# Patient Record
Sex: FEMALE | Race: WHITE | NOT HISPANIC OR LATINO | Employment: UNEMPLOYED | URBAN - METROPOLITAN AREA
[De-identification: names, ages, dates, MRNs, and addresses within clinical notes are randomized per-mention and may not be internally consistent; named-entity substitution may affect disease eponyms.]

---

## 2017-01-05 ENCOUNTER — OFFICE VISIT (OUTPATIENT)
Dept: ALLERGY | Facility: CLINIC | Age: 32
End: 2017-01-05
Payer: COMMERCIAL

## 2017-01-05 ENCOUNTER — HOSPITAL ENCOUNTER (OUTPATIENT)
Dept: RADIOLOGY | Facility: HOSPITAL | Age: 32
Discharge: HOME | End: 2017-01-05
Attending: ALLERGY & IMMUNOLOGY
Payer: COMMERCIAL

## 2017-01-05 DIAGNOSIS — R60.9 SWELLING: ICD-10-CM

## 2017-01-05 DIAGNOSIS — T78.3XXA ANGIOEDEMA, INITIAL ENCOUNTER: ICD-10-CM

## 2017-01-05 DIAGNOSIS — L50.9 URTICARIA, UNSPECIFIED: Primary | ICD-10-CM

## 2017-01-05 DIAGNOSIS — L50.9 URTICARIA, UNSPECIFIED: ICD-10-CM

## 2017-01-05 DIAGNOSIS — Z83.49 FAMILY HISTORY OF THYROID DISEASE: ICD-10-CM

## 2017-01-05 DIAGNOSIS — L29.9 ITCHING: ICD-10-CM

## 2017-01-05 PROCEDURE — 71020 XR CHEST PA AND LATERAL: CPT | Mod: 26,,, | Performed by: RADIOLOGY

## 2017-01-05 PROCEDURE — 71020 XR CHEST PA AND LATERAL: CPT | Mod: TC

## 2017-01-05 PROCEDURE — 99999 PR PBB SHADOW E&M-NEW PATIENT-LVL III: CPT | Mod: PBBFAC,,, | Performed by: ALLERGY & IMMUNOLOGY

## 2017-01-05 PROCEDURE — 99204 OFFICE O/P NEW MOD 45 MIN: CPT | Mod: S$GLB,,, | Performed by: ALLERGY & IMMUNOLOGY

## 2017-01-05 PROCEDURE — 1159F MED LIST DOCD IN RCRD: CPT | Mod: S$GLB,,, | Performed by: ALLERGY & IMMUNOLOGY

## 2017-01-05 RX ORDER — PREDNISONE 20 MG/1
40 TABLET ORAL DAILY
COMMUNITY
End: 2021-10-22

## 2017-01-05 RX ORDER — HYDROXYZINE HYDROCHLORIDE 50 MG/1
50 TABLET, FILM COATED ORAL 4 TIMES DAILY
COMMUNITY
End: 2021-10-22

## 2017-01-05 RX ORDER — METHYLPREDNISOLONE 4 MG/1
TABLET ORAL
Qty: 88 TABLET | Refills: 0 | Status: SHIPPED | OUTPATIENT
Start: 2017-01-05 | End: 2021-10-22

## 2017-01-05 NOTE — MR AVS SNAPSHOT
[facility] - Allergy  [address] [facility] [address]  Phone: [phone]  Fax: [phone]                  Nguyen Hanks   2017 2:00 PM   Office Visit    Description:  Female : 1985   Provider:  Bala Roberto MD   Department:  [facility]           Reason for Visit     Urticaria     Rash     Itching     Other           Diagnoses this Visit        Comments    Urticaria, unspecified    -  Primary     Itching         Swelling         Angioedema, initial encounter         Family history of thyroid disease                To Do List           Future Appointments        Provider Department Dept Phone    2017 3:30 PM LABORATORY, [address] [facility] [phone]      Goals (5 Years of Data)     None       These Medications        Disp Refills Start End    methylPREDNISolone (MEDROL) 4 MG Tab 88 tablet 0 2017     4 pills orally on , then 4 tid x 2d. 4 bid x 3d, 3 bid x 3d, 2 bid x 3d, 2 in AM x 3d, stop.    Pharmacy: [facility] [address] [place] [address] [address] AT [facility] Ph #: [phone]         [facility]     [facility] Care [facility] -  Assistance  Registered nurses in the [facility] provide clinical advisement, health education, appointment booking, and other advisory services.  Call for this free service at [phone].             Medications           Message regarding Medications     Verify the changes and/or additions to your medication regime listed below are the same as discussed with your clinician today.  If any of these changes or additions are incorrect, please notify your healthcare provider.        START taking these NEW medications        Refills    methylPREDNISolone (MEDROL) 4 MG Tab 0    Si pills orally on , then 4 tid x 2d. 4 bid x 3d, 3 bid x 3d, 2 bid x 3d, 2 in AM x 3d, stop.    Class: Normal           Verify that the below list of  "medications is an accurate representation of the medications you are currently taking.  If none reported, the list may be blank. If incorrect, please contact your healthcare provider. Carry this list with you in case of emergency.           Current Medications     hydrOXYzine (ATARAX) 50 MG tablet Take 50 mg by mouth 4 (four) times daily.    predniSONE (DELTASONE) 20 MG tablet Take 40 mg by mouth once daily.    methylPREDNISolone (MEDROL) 4 MG Tab 4 pills orally on Jan 5, then 4 tid x 2d. 4 bid x 3d, 3 bid x 3d, 2 bid x 3d, 2 in AM x 3d, stop.           Clinical Reference Information           Vital Signs - Last Recorded  Most recent update: 1/5/2017  2:15 PM by Kenyatta Jason LPN    Temp Ht Wt BMI       98.7 °F (37.1 °C) 5' 7.25" (1.708 m) 70.4 kg (155 lb 3.3 oz) 24.13 kg/m2       Allergies as of 1/5/2017     Morphine      Immunizations Administered on Date of Encounter - 1/5/2017     None      Orders Placed During Today's Visit     Future Labs/Procedures Expected by Expires    COLLEEN  1/5/2017 3/6/2018    Anti-neutrophilic cytoplasmic antibody  1/5/2017 3/6/2018    Anti-thyroglobulin antibody  1/5/2017 1/5/2018    C-reactive protein  1/5/2017 3/6/2018    CBC auto differential  1/5/2017 1/5/2018    Complement, total  1/5/2017 3/6/2018    Comprehensive metabolic panel  1/5/2017 1/5/2018    Sedimentation rate, manual  1/5/2017 3/6/2018    Thyroid peroxidase antibody  1/5/2017 1/5/2018    TSH  1/5/2017 1/5/2018    X-Ray Chest PA And Lateral  1/5/2017 1/5/2018      [facility] Sign-Up     Activating your [facility] account is as easy as 1-2-3!     1) Visit [email or web address], select Sign Up Now, enter this activation code and your date of birth, then select Next.  [email or web address]  Expires: 2/19/2017  3:19 PM      2) Create a username and password to use when you visit [facility] in the future and select a security question in case you lose your password and select Next.    3) Enter your e-mail address and click Sign " Up!    Additional Information  If you have questions, please e-mail [email or web address] or call [phone] to talk to our [facility] staff. Remember, [facility] is NOT to be used for urgent needs. For medical emergencies, dial 911.         Instructions    Per discussion.

## 2017-01-06 ENCOUNTER — TELEPHONE (OUTPATIENT)
Dept: ALLERGY | Facility: CLINIC | Age: 32
End: 2017-01-06

## 2017-01-06 VITALS
HEART RATE: 76 BPM | TEMPERATURE: 99 F | DIASTOLIC BLOOD PRESSURE: 68 MMHG | RESPIRATION RATE: 15 BRPM | HEIGHT: 67 IN | BODY MASS INDEX: 24.36 KG/M2 | WEIGHT: 155.19 LBS | SYSTOLIC BLOOD PRESSURE: 100 MMHG

## 2017-01-06 NOTE — PROGRESS NOTES
Chief complaint: Rash itching hives swelling    This note was dictated using voice recognition software and may contain errors.    History:    She stated that she has been symptomatic since January 1, 2017 with rash itching hives and swelling.    She had a 2 PM appointment on Thursday, January 5, 2017.    She stated on December 31, 2016 she went to bed about 9:30 PM.  She was feeling well at that time.  She was awakened shortly before midnight and got up for a brief period of time.  While awake she did not observe any rash hives swelling and was not itching.  She stated at 7:38 PM on January 1, 2017 when she awakened she was aware of hives on her arms.  She has taken multiple photographs of the appearance of her skin with her cellular telephone.  I reviewed with her the photos which she had taken.  The appearance of the skin in the photos is consistent with urticaria.  Significant urticarial lesions were noted on the trunk and extremities.    She stated on January 1 she received treatment at the [facility] emergency room, [facility].  She received and injection of corticosteroid and also Benadryl.  She was instructed to take Benadryl.  No diagnostic tests were performed.  She stated on January 4, yesterday, she sought reevaluation because of worsening of her rash hives itching and swelling.  She was evaluated yesterday and received prescriptions for hydroxyzine 50 mg and prednisone.  She took hydroxyzine last night.  It didn't give relief of itching and didn't cause sedation.  Upon awakening this morning she does not feel sedated.  She took 40 mg of prednisone this morning.    She is not been experiencing any fever nausea vomiting or diarrhea.    She does not believe that she is ALLERGIC to any foods or animals.  In the past upon one occasion she received morphine for pain relief and developed 1 urticaria lesion.    Family history is positive for thyroid disease.  She is not aware of any family members  with lupus or rheumatoid arthritis.    Past medical history she has no history of nasal polyps asthma and hayfever heart disease liver disease or thyroid disease.  She does have a history of kidney stones.  She has no history of lupus or rheumatoid arthritis.    She stated in the summer of 2016 she was working in her yard and developed some swelling of the right knee which resolved.  Otherwise, arthritis is not been a problem for her and she said no other swelling of the joints.    She has not experienced swelling of the tongue or in the throat.    Social history she has 2 children at home ages 7 and 2 years.  She is at home with her children and currently is not employed outside the home.    She is understandably concerned as to why the recent itching rash and swelling has developed.  As of this time, no diagnostic tests a been performed.    Review of systems: See above.    Exam:    In general she is in no distress.  She is not toxic.  She is alert oriented well-groomed well-developed in good mood and attentive  Gait steady  Head swelling of the lips consistent with angioedema observed.  1 urticaria lesion is present on the lower left eyelid  Skin urticarial lesions are noted on the extremities  Eyes sclera white conjunctiva pink  Nose patent no polyps seen  Ears not inflamed tympanic membranes not inflamed  Mouth slight swelling of the lips appreciated.  No vesicles noted no swelling of the tongue noted no exudate or inflammation of the oral cavity noted  Neck supple no thyromegaly or masses noted  Lymph nodes no significant cervical or epitrochlear lymphadenopathy noted  Lungs clear to auscultation  Heart regular rhythm no murmurs heard  Extremities no swelling or inflammation of the hands or legs noted    Impression:    #1 urticaria, cause uncertain  #2 swelling and angioedema, cause uncertain  #3 itching  #4 family history of thyroid disease    Assessment and plan:    While she was in the exam room with patric SALAZAR  called a local [facility] pharmacy located close to the clinic building.  Methylprednisolone 4 mg tablets were available to prescribed.  A prescription was issued and was transmitted electronically to her pharmacy for this medication.  I recommended she stop taking prednisone.  This evening I suggested she take methylprednisolone 4 mg tablets, 4 pills.  On the morning and January 6 she will take 4 pills.  I requested that she call tomorrow and report upon her status.  She was given the office phone number.  She was given handwritten directions regarding the use of the Medrol 4 mg.  I'm recommended she begin taking Claritin 10 mg every morning.  She may continue to take hydroxyzine 50 mg at bedtime as this was helpful.  She is aware of the fact that this medication caused her to feel sedated.  I recommended that she begin taking Zantac 150 mg twice a day.  I reviewed with her potential side effects of these medicines.  The potential short-term and long-term side effects of steroid therapy were discussed in detail, including the possibility of avascular necrosis occurring in association with the administration of cortico-steroids.    Should she experience swelling of the tongue or in the throat I recommended she be transported immediately to the nearest emergency facility for additional evaluation and treatment.    I told her I could not state with certainty why she became symptomatic on January 1.  She denied recent infectious illness symptoms.  I have suggested that diagnostic tests be performed including the performance of a chest x-ray and diagnostic laboratory tests.  This is agreeable with her.  Arrangements were made to have the diagnostic tests performed after her appointment with me today.    Her appointment was 70 minutes in duration spent entirely in face-to-face contact.  More than 50% of the visit was spent in counseling and coordination of care.  We had a lengthy discussion regarding urticaria and  angioedema.  I reviewed with her that these conditions may occur in approximately 25% of the population at some point in individuals life.    I called her approximately 6:25 PM on January 5.  I did not reach her.  I left a message on the answering system of her telephone regarding the report of her chest x-ray.  Results were satisfactory.    Additional recommendations may be made tomorrow when she calls based upon her clinical status and response or lack of response to treatment suggestions made in addition to the results of diagnostic tests which have been ordered.    I requested she keep the use of unnecessary medicines to a minimum.  I reviewed with her that nonsteroidal anti-inflammatory medications may provoke urticaria and angioedema.  I told her that he may provoke itching.  I told her local application of cold may diminish itching.  If she has any swelling of the face I suggested that she sleep with her head elevated above the level of her heart.  She has observed that her clothing may provoke pressure-induced urticaria.  I suggested that she wear loosely fitting clothing.  I reviewed with her in detail the reasons for my recommendations that she take H1 and H2 antihistamines concurrently and also received methylprednisolone as an anti-inflammatory agent    She was given the office phone number.  Should she have any additional questions or concerns she was instructed to call.

## 2017-01-06 NOTE — TELEPHONE ENCOUNTER
This note was dictated using voice recognition software and may contain errors.    She called today as requested.  I spoke with her, finishing my telephone conversation with her about 3:10 PM.    She has improved some.  She stated about 1 AM today she did experience significant worsening of her urticaria.  She is gradually improved since that.  This afternoon she is feeling better.    I reviewed with her results of laboratory tests available at this time.  Earlier this week she had received corticosteroids.  Her C reactive protein level and sedimentation rate were elevated even though she had been receiving steroid therapy.    I reviewed with her the report of her chest x-ray.    I reviewed with her guidelines for taking medication.  She will take Medrol 4 mg 4 pills at 6 PM today.  She will continue to take antihistamines as directed.    She stated from an emotional viewpoint she's tolerating the Medrol.  She stated today she has experienced some diarrhea.  I have requested that she keep a written record of the number of bowel movements which she experiences.  She has not observed any blood in the diarrhea.    I told her I will contact her tomorrow, Saturday, January 7, 2017.  Reassessment will occur at that time.  Additional recommendations will be made.

## 2017-01-07 ENCOUNTER — TELEPHONE (OUTPATIENT)
Dept: ALLERGY | Facility: CLINIC | Age: 32
End: 2017-01-07

## 2017-01-07 NOTE — TELEPHONE ENCOUNTER
This note was dictated using voice recognition software and may contain errors.    At 2:26 PM on Saturday, January 7, 2016 I finished my telephone conversation with her.    Since speaking with her yesterday afternoon she has improved some.  Upon awakening this morning she had limited urticaria which has improved.    She stated she seems to be tolerating the oral Medrol.  Her diarrhea has stopped.    In listening to the quality of her voice during our telephone conversation today I told her it was my assessment that she seemed to be experiencing some nasal obstruction and excessive nasal mucus production.  She stated this is the case.  She stated actually she believes that she may have developed an illness about December 31 or January 1.  She stated when she sought care this past week that a strep screen had been performed and she was told it was negative.  This was done because she complained of a sore throat.  I told her at the time of her appointment on Thursday, January 5 I did not appreciate the fact that she was suspicious that she may have had a recent infectious illness.    I told her if indeed she has a viral illness at this time this may be quite significant as it relates to possibly contributing to provoking her urticaria and angioedema in addition to her respiratory symptoms and possibly her diarrhea.    She will continue to take medications as directed.  Today she should take Medrol 4 mg 3 times a day.  I told her I will call her tomorrow so that I may learn of her status.    I reviewed with her that use of oral steroids in a fashion that she is taking them at this time potentially could impact her menstrual cycle.  I told her that steroids may also provoke the development of acne in some individuals.    I inquired as to whether or not she had been exposed to anyone in recent weeks who had mononucleosis.  She stated that a relative has had mononucleosis within the past 2 months but that she was not  exposed to the individual when this person was symptomatic.

## 2017-01-08 ENCOUNTER — TELEPHONE (OUTPATIENT)
Dept: ALLERGY | Facility: CLINIC | Age: 32
End: 2017-01-08

## 2017-01-08 NOTE — TELEPHONE ENCOUNTER
This note was dictated using voice recognition software and may contain errors.    When I spoke with her yesterday, Saturday, January 7, 2017 I told her I would call her on Sunday, January 8, 2017.  I called her upon 2 occasions on Sunday January 8, 2017.  The first time I called was at 4:03 PM and the second time I called was at 5:11 PM.  Each time I did not reach her.  Each time I called I did leave a message on the answering system of her telephone.  The first message stated that I would try contacting her again today which I did when I called at 5:11 PM.  At 5:11 PM I left a message stating that I would try contacting her early on the morning of Monday, January 9, approximately 8 AM.

## 2017-01-09 ENCOUNTER — TELEPHONE (OUTPATIENT)
Dept: ALLERGY | Facility: CLINIC | Age: 32
End: 2017-01-09

## 2017-01-09 NOTE — TELEPHONE ENCOUNTER
This note was dictated using voice recognition software may contain errors.    She called today.  I returned her phone call and spoke with her, finishing our conversation at 11:45 AM on Monday, January 9, 2017.  She stated when I called her twice yesterday afternoon she was asleep.  I did called her this morning and 7:57 AM and left a message requesting that she call.    She stated on the morning of Sunday, January 8 she had limited urticaria.  Some of these urticarial lesions were associated with the development of bruising as a result.    This morning she has only 2 or 3 urticarial lesions.  They do itch.  She is not noticing itching elsewhere.    She is no longer having diarrhea.  She does continue to experience upper respiratory symptoms such as nasal obstruction and excessive nasal mucus production consistent with a viral respiratory tract infectious illness.    She will continue to take medication as directed.  Today and tomorrow she will take Medrol 4 mg, 4 pills twice a day.  I requested she call tomorrow afternoon and report upon her status.    I reviewed with her results of laboratory tests.

## 2017-01-09 NOTE — TELEPHONE ENCOUNTER
----- Message from Kenyatta Jason LPN sent at 1/9/2017 11:04 AM CST -----  Contact: Patient       ----- Message -----     From: Haleigh Neil     Sent: 1/9/2017  10:39 AM       To: P[facility] Staff    Patient returned call, Please call her at [phone].    Thanks  Td

## 2017-01-10 ENCOUNTER — TELEPHONE (OUTPATIENT)
Dept: ALLERGY | Facility: CLINIC | Age: 32
End: 2017-01-10

## 2017-01-10 NOTE — TELEPHONE ENCOUNTER
I called her. Phone call concluded 4:05 PM on 1-.  She is doing better.  Will call on 1-11 and report.  Continue medications as directed.

## 2017-01-11 ENCOUNTER — TELEPHONE (OUTPATIENT)
Dept: ALLERGY | Facility: CLINIC | Age: 32
End: 2017-01-11

## 2017-01-11 NOTE — TELEPHONE ENCOUNTER
This note was dictated using voice recognition software and may contain errors.    I called her at 4:39 PM on Wednesday, January 11, 2017.  I did not reach her.  I left a message for her stating that I would try calling again.  I called her again at 5:49 PM on January 11, 2017.  I did not reach her.  I left a message on the answering system of her telephone stating that I would try contacting her tomorrow.

## 2017-01-11 NOTE — TELEPHONE ENCOUNTER
----- Message from Kenyatta Jason LPN sent at 1/11/2017  9:38 AM CST -----  Contact: pt      ----- Message -----     From: Kaz Bell     Sent: 1/11/2017   9:24 AM       To: [facility] Staff    Pt is calling nurse staff regarding pt up date on pt condition. Pt call back [phone] thanks

## 2017-01-11 NOTE — TELEPHONE ENCOUNTER
"This note was dictated using voice recognition software and may contain errors.    She called this morning at 9:25 AM.  I returned her phone call at 9:54 AM, finishing my telephone conversation at 10:11 AM.    She stated this morning she has resumed having some diarrhea.  She stated also that her  and her 2 year old child have had some diarrhea.    She informed me that she visited her grandfather, I believe on Saturday, January 7.  He is evidently hospitalized and has been receiving treatment for Clostridium difficile.    She stated last evening she shaved her legs.  This morning she has some urticarial lesions on her legs.  They do not itch very much.    I suggested that this time that she not take any additional corticosteroids.  Additional recommendations regarding use of steroids will be made based upon her clinical course.    She stated that some of her stool was very dark.  She stated"black".  I recommended that she contact her primary physician and make the doctor aware of her status and concerns.  She stated typically she sees a nurse practitioner that works at a local clinic, Valeria Lam NP.  I recommended she contact Mrs. Lam after finishing our telephone conversation and make Mrs. Lam aware of her recent health situation and current concerns.  I told her it is possible to do tests to evaluate stool specimens for the possibility of clostridium difficile and also whether or not any blood is in the stool.  In view of her diarrhea and her observations this morning I recommended that she be evaluated and then appropriate diagnostic tests be performed.    I requested that she contact me later today and make me aware of her status and recommendations that were made.  I told her that I certainly appreciate her concerns regarding her health in the health of her family members.  Therefore, I have emphasized to her the importance of obtaining additional appropriate evaluation this morning.  "

## 2017-01-12 ENCOUNTER — TELEPHONE (OUTPATIENT)
Dept: ALLERGY | Facility: CLINIC | Age: 32
End: 2017-01-12

## 2017-01-12 NOTE — TELEPHONE ENCOUNTER
This office note has been dictated.   MRN:[id number] CSN: [id number]    Phone call concluded 9:13 AM.  Will speak again this afternoon.

## 2017-01-12 NOTE — PROGRESS NOTES
OFFICE NOTE    She called this morning at 8:43 a.m.  I returned her phone call shortly before   9:00 a.m., finishing my telephone conversation with her at 9:13 a.m.    Since speaking with her yesterday morning she has been fairly stable.  During   the past 24 hours, she stated that she had three bowel movements.  Her bowel   movements are poorly formed stool with some liquid.  She is not observing black   stools.    She stated on 01/11, she did see a nurse practitioner at the clinic where she   receives health care.  She stated that several diagnostic tests have been   ordered and are being performed on her stool specimen.  She stated that she took   a stool specimen to the clinic today.  She stated that she will know some of   the results later today.  I requested that she call late this afternoon and make   me aware of her status.  I told her if I do not hear from her, I will attempt   to contact her.    She stated that she does not have any family history of ulcerative colitis or   Crohn disease.  She stated that her  and her 2-year-old are not becoming   significantly sicker with diarrhea.    She stated that she has not developed inflammation of the eyes or oral cavity.    She has not developed swollen or painful joints suggestive of arthritis.  She   stated that she has not experienced any fever.    This morning she has a limited number of hives.  She stated yesterday that she   observed perhaps minimal bruising at the site where hives have been on her skin.    Her last dose of oral steroids was yesterday morning.  At this time, she will   continue not to take oral steroids.  She will continue to take Zantac twice a   day, Claritin in the morning and hydroxyzine in the evening.    I emphasized the importance of hydration and eating foods that typically she   knows she will tolerate.  I suggested that she be certain to maintain her   hydration.    She stated that she did receive the messages which I left  on the answering   system on her telephone yesterday afternoon.      KYLIE  dd: 01/12/2017 09:19:13 (CST)  td: 01/12/2017 16:44:15 (CST)  Doc ID   #[id number]  Job ID #[id number]    CC:

## 2017-01-13 ENCOUNTER — TELEPHONE (OUTPATIENT)
Dept: ALLERGY | Facility: CLINIC | Age: 32
End: 2017-01-13

## 2017-01-13 NOTE — TELEPHONE ENCOUNTER
This note was dictated using voice recognition software and may contain errors.    I called her at 5:51 PM on January 12, 2017 and spoke with her.  She's done fairly well today.  Her urticaria is under fairly good control.  She is not received the results of any of the diagnostic tests which were performed.    We have reviewed guidelines for treatment of her urticaria.  I requested she call on January 13 and report upon her status.

## 2017-01-13 NOTE — TELEPHONE ENCOUNTER
This note was dictated using voice recognition software may contain errors.    I spoke with her on January 13, 2017.  We finished our telephone conversation at 1:59 PM.    Since speaking with her last evening she's been fairly stable.  She had a few urticarial lesions on her legs and one on her cheek this morning upon awakening.  She did not require the use of steroids through the night.    She informed me that the laboratory his told her that the test that was performed for Clostridium difficile toxin May and toxin B was negative.  The test for occult blood in the stool was negative.    I told her I will contact her tomorrow so that we may discuss her status.  Additional recommendations may be made at that time.

## 2017-01-14 ENCOUNTER — TELEPHONE (OUTPATIENT)
Dept: ALLERGY | Facility: CLINIC | Age: 32
End: 2017-01-14

## 2017-01-14 NOTE — TELEPHONE ENCOUNTER
This note was dictated using voice recognition software and may contain errors.    I spoke with her on the telephone on Saturday, January 14, 2017.  We finished our telephone conversation at 2:15 PM.  Overnight she did well.  This morning she did not have any urticaria.  She is not itching.  Her respiratory symptoms are significantly improved.  Her GI tract is returning to normal after her recent illness with diarrhea.    She is not required oral steroids for several days.  She is taking only antihistamines.  I told her I will contact her tomorrow to learn of her status.

## 2017-01-15 ENCOUNTER — TELEPHONE (OUTPATIENT)
Dept: ALLERGY | Facility: CLINIC | Age: 32
End: 2017-01-15

## 2017-01-15 NOTE — TELEPHONE ENCOUNTER
This note was dictated using voice recognition software may contain errors.    I called her on Sunday afternoon January 15, 2017 and spoke with her.  We finished our telephone conversation at 2:58 PM.    During the past 24 hours she has done well.  She did not have any urticaria develop.  She is had minimal itching which resolved.  Her GI tract is improving.  She is having less and less diarrhea or poorly formed stools.    She is been off of the steroids as of January 11.  She will continue to take her H1 and H2 antihistamines as directed.  I requested she call on the afternoon of Monday, January 16 and report upon her status.

## 2017-01-16 ENCOUNTER — TELEPHONE (OUTPATIENT)
Dept: ALLERGY | Facility: CLINIC | Age: 32
End: 2017-01-16

## 2017-01-16 NOTE — TELEPHONE ENCOUNTER
This note was dictated using voice recognition software may contain errors.    I spoke with her on the telephone on Monday, January 16.  We finished our telephone conversation at 2:59 PM.  Since talking with her yesterday afternoon she is been stable.  She is feeling better.  She does not yet have the results of all of the diagnostic tests were performed.  She will continue to take medication as directed.  I told her that we should speak tomorrow.

## 2017-01-18 ENCOUNTER — TELEPHONE (OUTPATIENT)
Dept: ALLERGY | Facility: CLINIC | Age: 32
End: 2017-01-18

## 2017-01-18 NOTE — TELEPHONE ENCOUNTER
This note was dictated using voice recognition software may contain errors.    I called her on Wednesday, January 18, 2017 at 4:31 PM.  I did not reach her and did not speak with her.  I did leave a message for her on the answering system of her telephone.

## 2017-01-20 ENCOUNTER — TELEPHONE (OUTPATIENT)
Dept: ALLERGY | Facility: CLINIC | Age: 32
End: 2017-01-20

## 2017-01-20 NOTE — TELEPHONE ENCOUNTER
I finished my telephone conversation with her at 5:05 PM on Friday, January 20, 2017.  Since I spoke with earlier this week she is been doing well.  She stated that all of the laboratory tests that were performed on her stool specimen were reported as being negative.  These tests included evaluation for clostridium difficile bacteria parasites, ova and cysts.    She stated that her GI symptoms have improved and her bowel habits are returning to normal.  She is no longer experiencing diarrhea.  She stated that her respiratory tract has improved.    She stated today she is felt somewhat itchy.  She is not had any hives.  She continues to take H1 and H2 antihistamines as directed.  I recommended that she continue to do so.  She is not had any oral steroids in approximately 10 days.    I requested she call between January 25 and 27 2 report upon her status.  She should call sooner if needed.    This note was dictated using voice recognition software and may contain errors.

## 2017-01-27 ENCOUNTER — TELEPHONE (OUTPATIENT)
Dept: ALLERGY | Facility: CLINIC | Age: 32
End: 2017-01-27

## 2017-01-27 NOTE — TELEPHONE ENCOUNTER
This note was dictated using voice recognition software and may contain errors.    She called today as requested.  I returned her phone call and spoke with her.  We finished our conversation at 2:50 PM on January 27.    During the past week she is done well.  Her GI symptoms have ceased.  Her bowel function has returned to normal.    Her respiratory tract infectious illnesses resolved.    She has observed when she is for down to take a dose of her antihistamines that she is experienced itching.    At this time I recommended that she continue to take H1 and H2 antihistamines daily as directed.    I requested she call in one week and report upon her status, sooner if needed.    She stated this week she had a postoperative follow-up visit with her surgeon.  She stated that he was pleased with her status and progress.